# Patient Record
Sex: MALE | Race: WHITE
[De-identification: names, ages, dates, MRNs, and addresses within clinical notes are randomized per-mention and may not be internally consistent; named-entity substitution may affect disease eponyms.]

---

## 2021-04-07 ENCOUNTER — TRANSCRIPTION ENCOUNTER (OUTPATIENT)
Age: 21
End: 2021-04-07

## 2022-01-18 ENCOUNTER — ATHLETIC TRAINING (OUTPATIENT)
Dept: SPORTS MEDICINE | Facility: OTHER | Age: 22
End: 2022-01-18

## 2022-01-18 DIAGNOSIS — U07.1 COVID-19: Primary | ICD-10-CM

## 2022-01-18 NOTE — PROGRESS NOTES
1/18/22  A: COVID-19 Positive  Pt  Had a positive result during the week of 12/28  Started return to play with lifting session today    LB ATC

## 2022-04-11 ENCOUNTER — ATHLETIC TRAINING (OUTPATIENT)
Dept: SPORTS MEDICINE | Facility: OTHER | Age: 22
End: 2022-04-11

## 2022-04-11 DIAGNOSIS — S06.0X0A CONCUSSION WITHOUT LOSS OF CONSCIOUSNESS, INITIAL ENCOUNTER: Primary | ICD-10-CM

## 2022-04-11 NOTE — PROGRESS NOTES
Athletic Training Head Injury Evaluation     Name: Ruthie Harris  Age: 24 y o  Assessment/Plan:     Visit Diagnosis: Concussion without loss of consciousness, initial encounter [S06 0X0A]     Treatment Plan: Work to no symptoms    []  Follow-up PRN  []  Follow-up prior to next practice/game for re-evaluation  [x]  Daily treatment/rehab  Progress note expected weekly  Referral:      [x]  Not needed at this time  []  Will re-evaluate tomorrow to determine if referral is needed  []  Referred to:      []  Coaching staff notified  []  Parent/Guardian Notified     Subjective:  Date of Assessment: 4/11/2022  Date of Injury: 4/10/22     History: PMH of concussion     How many diagnosed concussions has the athlete had in the past? 2          When was the most recent concussion? Spring 2021     How long was the recovery from the most recent concussion? Month     Has the athlete ever been: Yes No   Hospitalized for a head injury? [] [x]   Diagnosed/treated for headache disorder or migraines? [] [x]   Diagnosed with a learning disability/dyslexia? [] [x]   Diagnosed with ADD/ADHD [] [x]   Diagnosed with depression, anxiety, or other psychiatric disorder? [] [x]      Current medications?  If yes, please list:   [x]  None  []  Yes:          Symptom Evaluation     0 = No Symptoms; 1 or 2 = Mild Symptoms; 3 or 4 = Moderate Symptoms, 5 or 6 = Severe Symptoms       (Insert Columns as needed for subsequent dates)  Date: 4/11/2022   Symptom Symptom Score   Headache 2    Pressure in head  1   Neck Pain  0   Nausea or vomiting  0   Dizziness  2   Blurred Vision  1   Balance Problems  0   Sensitivity to light  1   Sensitivity to noise  0   Feeling slowed down  1   Feeling like "in a fog"  1   Don't feel right  1   Difficulty concentrating  1   Difficulty remembering  1   Fatigue or low energy  1   Confusion  0   Drowsiness  1   More emotional  0   Irritability  0   Sadness  0   Nervous or Anxious  0   Trouble falling asleep (if applicable)  0   Total number of Symptoms (of 22):  12   Symptom Severity Score (of 132):  14   Do your symptoms get worse with physical activity? Do your symptoms get worse with mental activity? If 100% is feeling perfectly normal, what percent of normal do you feel? 70%     If not 100%, why? Difficulty focusing     Cognitive Screening     Orientation 0 1   What month is it? [] []   What is the date today? [] []   What is the day of the week? [] []   What year is it? [] []   What time is it right now? (Within 1 hour) [] []   Orientation Score   of 5      Immediate Memory                                                                                                   Score (of 5)  List 5 Word Lists Trial 1 Trial 2 Trial 3   [] Finger, [place]il[place], [place], Lemon, Insect         [] Candle, Paper, Sugar, [place], Wagon         [] Baby, Money, Perfume, [place], Iron         [] Elbow, Apple, Carpet, Saddle, Bubble         [] [place], [place], Pepper, [place], Movie         [] Dollar, Honey, Mirror, Saddle, Aromas         Immediate Memory Score   of 15      Concentration      Digits Backwards   [] [] [] Yes No 0/1   4-9-3 5-2-6 1-4-2 [] []     6-2-9 4-1-5 6-5-8 [] []    3-8-1-4 1-7-9-5 6-8-3-1 [] []     3-2-7-9 4-9-6-8 3-4-8-1 [] []    6-2-9-7-1 4-8-5-2-7 4-9-1-5-3 [] []     1-5-2-8-6 6-1-8-4-3 6-8-2-5-1 [] []    7-1-8-4-6-2 8-3-1-9-6-4 3-7-6-5-1-9 [] []     5-3-9-1-4-8 7-2-4-8-5-6 9-2-6-5-1-4 [] []    Digits Backwards Score   of 4   Months in Reverse Order  0 1   Dec-Nov-Oct-Sept-Aug-July-Jun-May-Apr-Mar-Feb-Jan [] []   Month Score   of 1   Concentration Total Score (Digits + Months)   of 5      Neurological Screen       Yes No   Can the patient read aloud (e g  symptom check-list) and follow instructions without difficulty? [] []   Does the patient have a full pain-free PASSIVE cervical spine movement?  [] []   Without moving their head or neck, can the patient look side-to-side and up-and-down without double vision? [] []   Can the patient perform the finger nose coordination test normally? [] []   Can the patient perform tandem gait normally?  [] []      Balance Examination  Modified Balance Error Scoring System (mBESS) testing     Which foot was tested (i e  which is the non-dominant foot):  []  Left  []  Right          Condition Errors   Double leg stance   of 10   Single leg stance (non-dominant foot)   of 10   Tandem stance (non-dominant foot at back)   of 10   Total Errors   of 30      Delayed Recall     Total number of words recalled accurately   of 5       Vestibular Ocular Motor Screen     Test/Symptoms Headache  (0-10) Dizziness  (0-10) Nausea  (0-10) Fogginess   (0-10)   Starting Symptoms (0-10)           Smooth Pursuits           Saccades - Horizontal           Saccades - Vertical           Convergence           VOR - Horizontal           VOR - Vertical           Visual Motion Sensitivity Test           Comments (if applicable):         Head Injury Protocol Treatment Log  (Insert Columns as needed for subsequent dates)  Date:     Current Step of Protocol:           Exercise/Drills                                                                    [facility] ATC

## 2022-04-13 ENCOUNTER — ATHLETIC TRAINING (OUTPATIENT)
Dept: SPORTS MEDICINE | Facility: OTHER | Age: 22
End: 2022-04-13

## 2022-04-13 DIAGNOSIS — S06.0X0A CONCUSSION WITHOUT LOSS OF CONSCIOUSNESS, INITIAL ENCOUNTER: Primary | ICD-10-CM

## 2022-04-13 DIAGNOSIS — U07.1 COVID: Primary | ICD-10-CM

## 2022-04-18 ENCOUNTER — ATHLETIC TRAINING (OUTPATIENT)
Dept: SPORTS MEDICINE | Facility: OTHER | Age: 22
End: 2022-04-18

## 2022-04-18 DIAGNOSIS — S06.0X0A CONCUSSION WITHOUT LOSS OF CONSCIOUSNESS, INITIAL ENCOUNTER: Primary | ICD-10-CM

## 2022-04-18 DIAGNOSIS — S63.635A SPRAIN OF INTERPHALANGEAL JOINT OF LEFT RING FINGER, INITIAL ENCOUNTER: Primary | ICD-10-CM

## 2022-04-19 ENCOUNTER — ATHLETIC TRAINING (OUTPATIENT)
Dept: SPORTS MEDICINE | Facility: OTHER | Age: 22
End: 2022-04-19

## 2022-04-19 DIAGNOSIS — S63.635A SPRAIN OF INTERPHALANGEAL JOINT OF LEFT RING FINGER, INITIAL ENCOUNTER: Primary | ICD-10-CM

## 2022-04-19 NOTE — PROGRESS NOTES
4/18/22  Pt completed the RTP to stage 4 at home  He complete a full non-contact practice tonight      [facility] ATC

## 2022-04-19 NOTE — PROGRESS NOTES
Athletic Training Wrist/Hand Evaluation    Name: Teresa Persaud  Age: 24 y o  Date of Assessment: 4/18/2022    Assessment/Plan:     Visit Diagnosis: Sprain of interphalangeal joint of left ring finger, initial encounter [I66 195O]    Treatment Plan: Protect from reinjury    []  Follow-up PRN  [x]  Follow-up prior to next practice/game for re-evaluation  []  Daily treatment/rehab  Progress note expected weekly       Referral:     [x]  Not needed at this time  []  Referred to:     []  Coaching staff notified  []  Parent/Guardian Notified    Subjective:    Date of Injury: 4/18/22    Injury occurred during:     [x]  Practice  []  Competition  []  Other:     Mechanism: Finger extended    Previous History: Previous finger sprains    Reported Symptoms:     [x] Hyperextension [] Numbness or tingling   [] Hyperflexion [] Weakness   [] Snapping sensation [] Grinding   [] Felt pop [] Sharp pain   [] Pain with rest [] Burning   [] Pain with activity [] Dull or achy   [] Loss of motion       Objective:    Observation:     [x]  No observable findings compared bilaterally    [x] Swelling [] [place] finger   [] Ecchymosis [] Mallet finger   [] Atrophy [] Abnormal contours   [] Callous or blister [] Nail abnormality   [] Deformity [] Subungual hematoma   [] [place] deformity [] Ingrown nail   [] [place] neck deformity [] Laceration     Palpation: Medial TTP    Active Range of Motion:      Full  ROM Limited  ROM Pain  with  ROM No  Motion   Wrist Flexion [] [] [] []   Wrist Extension [] [] [] []   Pronation [] [] [] []   Supination [] [] [] []   Radial Deviation [] [] [] []   Ulnar Deviation [] [] [] []   Thumb Flexion [] [] [] []   Thumb Extension [] [] [] []   Thumb Abduction [] [] [] []   Thumb Adduction [] [] [] []   MP Flexion [] [] [] []   MP Extension [] [] [] []   PIP Flexion [] [] [] []   PIP Extension [] [] [x] []   DIP Flexion [] [] [] []   DIP Extension [] [] [] []     Manual Muscle Tests:     Not performed [x] 5 4+ 4 4- 3 or  Under   Wrist Flexion [] [] [] [] []   Wrist Extension [] [] [] [] []   Pronation [] [] [] [] []   Supination [] [] [] [] []   Radial Deviation [] [] [] [] []   Ulnar Deviation [] [] [] [] []   Thumb Flexion [] [] [] [] []   Thumb Extension [] [] [] [] []   Thumb Abduction [] [] [] [] []   Thumb Adduction [] [] [] [] []   MP Flexion [] [] [] [] []   MP Extension [] [] [] [] []   PIP Flexion [] [] [] [] []   PIP Extension [] [] [] [] []   DIP Flexion [] [] [] [] []   DIP Extension [] [] [] [] []     Special Tests:      (+)  Laxity (+)  Pain (-)  WNL Not  Tested   Compression [] [] [x] []   Distraction [] [] [x] []   Percussion [] [] [] [x]   Tuning Fork [] [] [] [x]   Valgus Stress [] [] [x] []   Varus Stress [] [] [x] []   Wrist [place] [] [] [] [x]   Tinel's [] [] [] [x]   Phalen's [] [] [] [x]   Reverse Phalen's [] [] [] [x]   Finkelstein's [] [] [] [x]   Whitley Scaphoid Shift [] [] [] [x]   Triangular Fibrocartilage [] [] [] [x]   Lunotriquetrial Shear [] [] [] [x]     Treatment Log:     Date:    Playing Status:        Exercise/Treatment

## 2022-04-19 NOTE — PROGRESS NOTES
Athlete came in on 4/19/22 for f/u on finger injury from yesterday  No additional damage or laxity was found, he said pain is 7/10 and has improved from last night when it happened

## 2022-05-18 PROBLEM — Z00.00 ENCOUNTER FOR PREVENTIVE HEALTH EXAMINATION: Status: ACTIVE | Noted: 2022-05-18

## 2022-08-15 ENCOUNTER — ATHLETIC TRAINING (OUTPATIENT)
Dept: SPORTS MEDICINE | Facility: OTHER | Age: 22
End: 2022-08-15

## 2022-08-15 DIAGNOSIS — R51.9 HEADACHE IN FRONT OF HEAD: Primary | ICD-10-CM

## 2022-08-15 NOTE — PROGRESS NOTES
Athletic Training Head Injury Evaluation     Name: Delmar Sebastian  Age: 24 y o  Sport: Football     Assessment/Plan:  Visit Diagnosis: No primary diagnosis found  Treatment Plan:     []  Follow-up PRN  [x]  Follow-up prior to next practice/game for re-evaluation  []  Daily treatment/rehab  Progress note expected weekly  Referral:   [x]  Not needed at this time  []  Will re-evaluate tomorrow to determine if referral is needed  []  Referred to:      Subjective:  Date of Assessment: 8/15/2022  Date of Injury: 8/15/22     SCAT5+ on paper was completed today after practice  Ath was sure his headache was from dehydration  He did not report any concussive symptoms  Ath was advised to report to hospital if he began to have concussive symptoms that worsened (e g  vomiting, deteriorating status)  Symptom Evaluation     0 = No Symptoms; 1 or 2 = Mild Symptoms; 3 or 4 = Moderate Symptoms, 5 or 6 = Severe Symptoms       (Insert Columns as needed for subsequent dates)  Date: 8/15/2022   Symptom Symptom Score   Headache  0   Pressure in head 0    Neck Pain  0   Nausea or vomiting  0   Dizziness  0   Blurred Vision 0    Balance Problems 0    Sensitivity to light  0   Sensitivity to noise  0   Feeling slowed down  0   Feeling like "in a fog"  0   Don't feel right  0   Difficulty concentrating  0   Difficulty remembering  0   Fatigue or low energy  0   Confusion  0   Drowsiness  0   More emotional  0   Irritability  0   Sadness  0   Nervous or Anxious  0   Trouble falling asleep (if applicable)  0   Total number of Symptoms (of 22):  0   Symptom Severity Score (of 132):  0   Do your symptoms get worse with physical activity? no   Do your symptoms get worse with mental activity?   no no

## 2022-08-16 ENCOUNTER — ATHLETIC TRAINING (OUTPATIENT)
Dept: SPORTS MEDICINE | Facility: OTHER | Age: 22
End: 2022-08-16

## 2022-08-16 DIAGNOSIS — S06.0X0D CONCUSSION WITHOUT LOSS OF CONSCIOUSNESS, SUBSEQUENT ENCOUNTER: ICD-10-CM

## 2022-08-16 DIAGNOSIS — R51.9 HEADACHE IN FRONT OF HEAD: Primary | ICD-10-CM

## 2022-08-16 NOTE — PROGRESS NOTES
Athletic Training Head Injury Progress Note     Name: Delmar Sebastian  Age: 24 y o  Assessment/Plan:     Visit Diagnosis: Headache in front of head [R51 9]     Treatment Plan: Start concussion protocol, SCAT5 score 22     [] Athlete will be evaluated by their Physician for a possible concussion  Referred to:   [] Athlete has a follow-up appointment with their Physician scheduled for:   [] Athlete will continue with their return to learn/return to sport plans as outlined below   [] Athlete has completed their gradual return to play and may return to full unrestricted activity  Return to Learn Step:     [] Pending physician evaluation   [] No school at this time  May return on:   [] Shortened school days   [] Return to learn plan in place   [] 31 [phone] 35 in place   [] Athlete may begin their gradual return to full academics   [] Athlete has returned to full academics      Return to Sport Step:     [] Pending physician evaluation   [] No physical activity at this time  [] May participate in symptom-limited activity that does not provoke or increase symptoms  [] Step 1 - Light aerobic exercise  Goal is to increase heart rate    [] Step 2 - Sport Specific drills  Goal is to add movement while continuing to increase heart rate    [] Step 3 - Non-contact training drills  Goals are exercise, coordination, and increased thinking  [] Step 4 - Full contact practice  Goal is to restore confidence  Objective is to assess functionality of the athlete during play  [] Step 5 - Return to sport with no restrictions         Subjective:        Symptom Evaluation     0 = No Symptoms; 1 or 2 = Mild Symptoms; 3 or 4 = Moderate Symptoms, 5 or 6 = Severe Symptoms       (Insert Columns as needed for subsequent dates)  Date: 4/11/2022   Symptom Symptom Score   Headache 2    Pressure in head  1   Neck Pain  0   Nausea or vomiting  0   Dizziness  2   Blurred Vision  1   Balance Problems  0   Sensitivity to light  1   Sensitivity to noise  0   Feeling slowed down  1   Feeling like "in a fog"  1   Don't feel right  1   Difficulty concentrating  1   Difficulty remembering  1   Fatigue or low energy  1   Confusion  0   Drowsiness  1   More emotional  0   Irritability  0   Sadness  0   Nervous or Anxious  0   Trouble falling asleep (if applicable)  0   Total number of Symptoms (of 22):  12   Symptom Severity Score (of 132):  14   Do your symptoms get worse with physical activity? Do your symptoms get worse with mental activity? Objective:   See treatment log below        Date:    Playing Status:        Exercise/Treatment                                                         Athletic Training Head Injury Evaluation     Name: Delmar Sebastian  Age: 24 y o  Sport: Football     Assessment/Plan:  Visit Diagnosis: No primary diagnosis found  Treatment Plan:     []  Follow-up PRN  [x]  Follow-up prior to next practice/game for re-evaluation  []  Daily treatment/rehab  Progress note expected weekly  Referral:   [x]  Not needed at this time  []  Will re-evaluate tomorrow to determine if referral is needed  []  Referred to:      Subjective:  Date of Assessment: 8/16/2022  Date of Injury: 8/15/22     SCAT5+ on paper was completed today after practice  Ath was sure his headache was from dehydration  He did not report any concussive symptoms  Ath was advised to report to hospital if he began to have concussive symptoms that worsened (e g  vomiting, deteriorating status)            Symptom Evaluation     0 = No Symptoms; 1 or 2 = Mild Symptoms; 3 or 4 = Moderate Symptoms, 5 or 6 = Severe Symptoms       (Insert Columns as needed for subsequent dates)  Date: 8/16/2022   Symptom Symptom Score   Headache  0   Pressure in head 0    Neck Pain  0   Nausea or vomiting  0   Dizziness  0   Blurred Vision 0    Balance Problems 0    Sensitivity to light  0   Sensitivity to noise  0   Feeling slowed down  0   Feeling like "in a fog"  0   Don't feel right  0   Difficulty concentrating  0   Difficulty remembering  0   Fatigue or low energy  0   Confusion  0   Drowsiness  0   More emotional  0   Irritability  0   Sadness  0   Nervous or Anxious  0   Trouble falling asleep (if applicable)  0   Total number of Symptoms (of 22):  0   Symptom Severity Score (of 132):  0   Do your symptoms get worse with physical activity? no   Do your symptoms get worse with mental activity?   no

## 2022-08-17 ENCOUNTER — ATHLETIC TRAINING (OUTPATIENT)
Dept: SPORTS MEDICINE | Facility: OTHER | Age: 22
End: 2022-08-17

## 2022-08-17 DIAGNOSIS — S06.0X0D CONCUSSION WITHOUT LOSS OF CONSCIOUSNESS, SUBSEQUENT ENCOUNTER: ICD-10-CM

## 2022-08-17 DIAGNOSIS — R51.9 HEADACHE IN FRONT OF HEAD: Primary | ICD-10-CM

## 2022-08-17 NOTE — PROGRESS NOTES
Athletic Training Head Injury Progress Note     Name: Marla Cronin  Age: 24 y o  Assessment/Plan:     Visit Diagnosis: Headache in front of head [R51 9]     Treatment Plan: Start concussion protocol, SCAT5 score 22     [] Athlete will be evaluated by their Physician for a possible concussion  Referred to:   [] Athlete has a follow-up appointment with their Physician scheduled for:   [x] Athlete will continue with their return to learn/return to sport plans as outlined below   [] Athlete has completed their gradual return to play and may return to full unrestricted activity  Return to Learn Step:     [] Pending physician evaluation   [] No school at this time  May return on:   [] Shortened school days   [] Return to learn plan in place   [] 31 58 35 in place   [] Athlete may begin their gradual return to full academics   [] Athlete has returned to full academics      Return to Sport Step:     [] Pending physician evaluation   [x] No physical activity at this time  [] May participate in symptom-limited activity that does not provoke or increase symptoms  [] Step 1 - Light aerobic exercise  Goal is to increase heart rate    [] Step 2 - Sport Specific drills  Goal is to add movement while continuing to increase heart rate    [] Step 3 - Non-contact training drills  Goals are exercise, coordination, and increased thinking  [] Step 4 - Full contact practice  Goal is to restore confidence  Objective is to assess functionality of the athlete during play  [] Step 5 - Return to sport with no restrictions  Subjective:   Pt is feeling much better  He focused on recovering and slept well  Decreased S/S        Symptom Evaluation     0 = No Symptoms; 1 or 2 = Mild Symptoms; 3 or 4 = Moderate Symptoms, 5 or 6 = Severe Symptoms       (Insert Columns as needed for subsequent dates)  Date: 4/11/2022   Symptom Symptom Score   Headache 2    Pressure in head  1   Neck Pain  0   Nausea or vomiting  0   Dizziness 2   Blurred Vision  1   Balance Problems  0   Sensitivity to light  1   Sensitivity to noise  0   Feeling slowed down  1   Feeling like "in a fog"  1   Don't feel right  1   Difficulty concentrating  1   Difficulty remembering  1   Fatigue or low energy  1   Confusion  0   Drowsiness  1   More emotional  0   Irritability  0   Sadness  0   Nervous or Anxious  0   Trouble falling asleep (if applicable)  0   Total number of Symptoms (of 22):  12   Symptom Severity Score (of 132):  14   Do your symptoms get worse with physical activity? Do your symptoms get worse with mental activity? Objective:   See treatment log below        Date:    Playing Status:        Exercise/Treatment                                                         Athletic Training Head Injury Evaluation     Name: Paul Russell  Age: 24 y o  Sport: Football     Assessment/Plan:  Visit Diagnosis: Headache in front of head [R51 9]     Treatment Plan:     []  Follow-up PRN  [x]  Follow-up prior to next practice/game for re-evaluation  []  Daily treatment/rehab  Progress note expected weekly  Referral:   [x]  Not needed at this time  []  Will re-evaluate tomorrow to determine if referral is needed  []  Referred to:      Subjective:  Date of Assessment: 8/17/2022  Date of Injury: 8/15/22     SCAT5+ on paper was completed today after practice  Ath was sure his headache was from dehydration  He did not report any concussive symptoms  Ath was advised to report to hospital if he began to have concussive symptoms that worsened (e g  vomiting, deteriorating status)            Symptom Evaluation     0 = No Symptoms; 1 or 2 = Mild Symptoms; 3 or 4 = Moderate Symptoms, 5 or 6 = Severe Symptoms       (Insert Columns as needed for subsequent dates)  Date: 8/17/2022   Symptom Symptom Score   Headache  0   Pressure in head 0    Neck Pain  0   Nausea or vomiting  0   Dizziness  0   Blurred Vision 0    Balance Problems 0    Sensitivity to light 0   Sensitivity to noise  0   Feeling slowed down  0   Feeling like "in a fog"  0   Don't feel right  0   Difficulty concentrating  0   Difficulty remembering  0   Fatigue or low energy  0   Confusion  0   Drowsiness  0   More emotional  0   Irritability  0   Sadness  0   Nervous or Anxious  0   Trouble falling asleep (if applicable)  0   Total number of Symptoms (of 22):  0   Symptom Severity Score (of 132):  0   Do your symptoms get worse with physical activity? no   Do your symptoms get worse with mental activity? no         Athletic Training Head Injury Progress Note     Name: Radha Casey  Age: 24 y o  Assessment/Plan:     Visit Diagnosis: Headache in front of head [R51 9]     Treatment Plan:      [] Athlete will be evaluated by their Physician for a possible concussion  Referred to:   [] Athlete has a follow-up appointment with their Physician scheduled for:   [] Athlete will continue with their return to learn/return to sport plans as outlined below   [] Athlete has completed their gradual return to play and may return to full unrestricted activity  Return to Learn Step:     [] Pending physician evaluation   [] No school at this time  May return on:   [] Shortened school days   [] Return to learn plan in place   [] 31 58 35 in place   [] Athlete may begin their gradual return to full academics   [] Athlete has returned to full academics      Return to Sport Step:     [] Pending physician evaluation   [] No physical activity at this time  [] May participate in symptom-limited activity that does not provoke or increase symptoms  [] Step 1 - Light aerobic exercise  Goal is to increase heart rate    [] Step 2 - Sport Specific drills  Goal is to add movement while continuing to increase heart rate    [] Step 3 - Non-contact training drills  Goals are exercise, coordination, and increased thinking  [] Step 4 - Full contact practice  Goal is to restore confidence   Objective is to assess functionality of the athlete during play  [] Step 5 - Return to sport with no restrictions  Subjective:        Symptom Evaluation     0 = No Symptoms; 1 or 2 = Mild Symptoms; 3 or 4 = Moderate Symptoms, 5 or 6 = Severe Symptoms       (Insert Columns as needed for subsequent dates)  Date: 4/11/2022   Symptom Symptom Score   Headache 2    Pressure in head  1   Neck Pain  0   Nausea or vomiting  0   Dizziness  2   Blurred Vision  1   Balance Problems  0   Sensitivity to light  1   Sensitivity to noise  0   Feeling slowed down  1   Feeling like "in a fog"  1   Don't feel right  1   Difficulty concentrating  1   Difficulty remembering  1   Fatigue or low energy  1   Confusion  0   Drowsiness  1   More emotional  0   Irritability  0   Sadness  0   Nervous or Anxious  0   Trouble falling asleep (if applicable)  0   Total number of Symptoms (of 22):  12   Symptom Severity Score (of 132):  14   Do your symptoms get worse with physical activity? Do your symptoms get worse with mental activity?              Objective:   See treatment log below        Date:    Playing Status:        Exercise/Treatment

## 2022-08-18 ENCOUNTER — ATHLETIC TRAINING (OUTPATIENT)
Dept: SPORTS MEDICINE | Facility: OTHER | Age: 22
End: 2022-08-18

## 2022-08-18 DIAGNOSIS — S06.0X0D CONCUSSION WITHOUT LOSS OF CONSCIOUSNESS, SUBSEQUENT ENCOUNTER: Primary | ICD-10-CM

## 2022-08-18 NOTE — PROGRESS NOTES
Athletic Training Head Injury Progress Note     Name: Judge Hayward  Age: 24 y o  Assessment/Plan:     Visit Diagnosis: Concussion without loss of consciousness, subsequent encounter [S06 0X0D]     Treatment Plan: Start concussion protocol, SCAT5 score 5     [] Athlete will be evaluated by their Physician for a possible concussion  Referred to:   [] Athlete has a follow-up appointment with their Physician scheduled for:   [x] Athlete will continue with their return to learn/return to sport plans as outlined below   [] Athlete has completed their gradual return to play and may return to full unrestricted activity  Return to Learn Step:     [] Pending physician evaluation   [] No school at this time  May return on:   [] Shortened school days   [x] Return to learn plan in place   [] [address] in place   [] Athlete may begin their gradual return to full academics   [] Athlete has returned to full academics      Return to Sport Step:     [] Pending physician evaluation   [] No physical activity at this time  [x] May participate in symptom-limited activity that does not provoke or increase symptoms  [] Step 1 - Light aerobic exercise  Goal is to increase heart rate    [] Step 2 - Sport Specific drills  Goal is to add movement while continuing to increase heart rate    [] Step 3 - Non-contact training drills  Goals are exercise, coordination, and increased thinking  [] Step 4 - Full contact practice  Goal is to restore confidence  Objective is to assess functionality of the athlete during play  [] Step 5 - Return to sport with no restrictions  Subjective:   Pt is feeling much better  He focused on recovering and slept well  Decreased S/S        Symptom Evaluation     0 = No Symptoms; 1 or 2 = Mild Symptoms; 3 or 4 = Moderate Symptoms, 5 or 6 = Severe Symptoms       (Insert Columns as needed for subsequent dates)  Date: 4/11/2022   Symptom Symptom Score   Headache 1   Pressure in head     Neck Pain Nausea or vomiting     Dizziness 1    Blurred Vision 1    Balance Problems    Sensitivity to light  1   Sensitivity to noise     Feeling slowed down     Feeling like "in a fog"     Don't feel right     Difficulty concentrating     Difficulty remembering     Fatigue or low energy  1   Confusion     Drowsiness     More emotional     Irritability     Sadness     Nervous or Anxious     Trouble falling asleep (if applicable)     Total number of Symptoms (of 22):  5   Symptom Severity Score (of 132):  5   Do your symptoms get worse with physical activity? yes   Do your symptoms get worse with mental activity?  no         He was able to complete a 10 minute bike without an increase in symptoms  He will check in tomorrow for symptoms checklist and follow up

## 2022-08-21 ENCOUNTER — ATHLETIC TRAINING (OUTPATIENT)
Dept: SPORTS MEDICINE | Facility: OTHER | Age: 22
End: 2022-08-21

## 2022-08-21 DIAGNOSIS — S06.0X0D CONCUSSION WITHOUT LOSS OF CONSCIOUSNESS, SUBSEQUENT ENCOUNTER: Primary | ICD-10-CM

## 2022-08-21 DIAGNOSIS — R51.9 HEADACHE IN FRONT OF HEAD: ICD-10-CM

## 2022-08-21 NOTE — PROGRESS NOTES
Athletic Training Head Injury Progress Note     Name: Bryant Pfefifer  Age: 24 y o  Assessment/Plan:     Visit Diagnosis: Concussion without loss of consciousness, subsequent encounter [S06 0X0D]     Treatment Plan: Start concussion protocol, SCAT5 score 5     [] Athlete will be evaluated by their Physician for a possible concussion  Referred to:   [] Athlete has a follow-up appointment with their Physician scheduled for:   [x] Athlete will continue with their return to learn/return to sport plans as outlined below   [] Athlete has completed their gradual return to play and may return to full unrestricted activity  Return to Learn Step:     [] Pending physician evaluation   [] No school at this time  May return on:   [] Shortened school days   [x] Return to learn plan in place   [] [address] in place   [] Athlete may begin their gradual return to full academics   [] Athlete has returned to full academics      Return to Sport Step:     [] Pending physician evaluation   [] No physical activity at this time  [x] May participate in symptom-limited activity that does not provoke or increase symptoms  [] Step 1 - Light aerobic exercise  Goal is to increase heart rate    [] Step 2 - Sport Specific drills  Goal is to add movement while continuing to increase heart rate    [] Step 3 - Non-contact training drills  Goals are exercise, coordination, and increased thinking  [] Step 4 - Full contact practice  Goal is to restore confidence  Objective is to assess functionality of the athlete during play  [] Step 5 - Return to sport with no restrictions  Subjective:   Pt is feeling much better  He focused on recovering and slept well  Decreased S/S        Symptom Evaluation     0 = No Symptoms; 1 or 2 = Mild Symptoms; 3 or 4 = Moderate Symptoms, 5 or 6 = Severe Symptoms       (Insert Columns as needed for subsequent dates)  Date: 4/11/2022   Symptom Symptom Score   Headache 1   Pressure in head     Neck Pain Nausea or vomiting     Dizziness 1    Blurred Vision 1    Balance Problems    Sensitivity to light  1   Sensitivity to noise     Feeling slowed down     Feeling like "in a fog"     Don't feel right     Difficulty concentrating     Difficulty remembering     Fatigue or low energy  1   Confusion     Drowsiness     More emotional     Irritability     Sadness     Nervous or Anxious     Trouble falling asleep (if applicable)     Total number of Symptoms (of 22):  5   Symptom Severity Score (of 132):  5   Do your symptoms get worse with physical activity? yes   Do your symptoms get worse with mental activity?  no         He was able to complete a 10 minute bike without an increase in symptoms  He will check in tomorrow for symptoms checklist and follow up       8/21  Pt still has symptoms and would like wait till Tuesday to start conditioning    LB ATC

## 2022-08-23 ENCOUNTER — ATHLETIC TRAINING (OUTPATIENT)
Dept: SPORTS MEDICINE | Facility: OTHER | Age: 22
End: 2022-08-23

## 2022-08-23 DIAGNOSIS — R51.9 HEADACHE IN FRONT OF HEAD: ICD-10-CM

## 2022-08-23 DIAGNOSIS — S06.0X0D CONCUSSION WITHOUT LOSS OF CONSCIOUSNESS, SUBSEQUENT ENCOUNTER: Primary | ICD-10-CM

## 2022-08-23 NOTE — PROGRESS NOTES
Athletic Training Head Injury Progress Note     Name: Delmar Sebastian  Age: 24 y o  Assessment/Plan:     Visit Diagnosis: No primary diagnosis found  Treatment Plan: Start concussion protocol, SCAT5 score 5     [] Athlete will be evaluated by their Physician for a possible concussion  Referred to:   [] Athlete has a follow-up appointment with their Physician scheduled for:   [x] Athlete will continue with their return to learn/return to sport plans as outlined below   [] Athlete has completed their gradual return to play and may return to full unrestricted activity  Return to Learn Step:     [] Pending physician evaluation   [] No school at this time  May return on:   [] Shortened school days   [x] Return to learn plan in place   [] [address] in place   [] Athlete may begin their gradual return to full academics   [] Athlete has returned to full academics      Return to Sport Step:     [] Pending physician evaluation   [] No physical activity at this time  [x] May participate in symptom-limited activity that does not provoke or increase symptoms  [] Step 1 - Light aerobic exercise  Goal is to increase heart rate    [] Step 2 - Sport Specific drills  Goal is to add movement while continuing to increase heart rate    [] Step 3 - Non-contact training drills  Goals are exercise, coordination, and increased thinking  [] Step 4 - Full contact practice  Goal is to restore confidence  Objective is to assess functionality of the athlete during play  [] Step 5 - Return to sport with no restrictions  Subjective:   Pt is feeling much better  He focused on recovering and slept well  Decreased S/S        Symptom Evaluation     0 = No Symptoms; 1 or 2 = Mild Symptoms; 3 or 4 = Moderate Symptoms, 5 or 6 = Severe Symptoms       (Insert Columns as needed for subsequent dates)  Date: 4/11/2022   Symptom Symptom Score   Headache 1   Pressure in head     Neck Pain     Nausea or vomiting     Dizziness 1 Blurred Vision 1    Balance Problems    Sensitivity to light  1   Sensitivity to noise     Feeling slowed down     Feeling like "in a fog"     Don't feel right     Difficulty concentrating     Difficulty remembering     Fatigue or low energy  1   Confusion     Drowsiness     More emotional     Irritability     Sadness     Nervous or Anxious     Trouble falling asleep (if applicable)     Total number of Symptoms (of 22):  5   Symptom Severity Score (of 132):  5   Do your symptoms get worse with physical activity? yes   Do your symptoms get worse with mental activity?  no         He was able to complete a 10 minute bike without an increase in symptoms  He will check in tomorrow for symptoms checklist and follow up       8/21  Pt still has symptoms and would like wait till Tuesday to start conditioning  LB ATC    8/23/22  Ath has 0 symptoms and feels 100%  Today he completed 20min on bike

## 2022-08-24 ENCOUNTER — ATHLETIC TRAINING (OUTPATIENT)
Dept: SPORTS MEDICINE | Facility: OTHER | Age: 22
End: 2022-08-24

## 2022-08-24 DIAGNOSIS — R51.9 HEADACHE IN FRONT OF HEAD: ICD-10-CM

## 2022-08-24 DIAGNOSIS — S06.0X0D CONCUSSION WITHOUT LOSS OF CONSCIOUSNESS, SUBSEQUENT ENCOUNTER: Primary | ICD-10-CM

## 2022-08-24 NOTE — PROGRESS NOTES
Athletic Training Head Injury Progress Note     Name: Jaciel Cain  Age: 24 y o  Assessment/Plan:     Visit Diagnosis: Concussion without loss of consciousness, subsequent encounter [S06 0X0D]     Treatment Plan: Start concussion protocol, SCAT5 score 5     [] Athlete will be evaluated by their Physician for a possible concussion  Referred to:   [] Athlete has a follow-up appointment with their Physician scheduled for:   [x] Athlete will continue with their return to learn/return to sport plans as outlined below   [] Athlete has completed their gradual return to play and may return to full unrestricted activity  Return to Learn Step:     [] Pending physician evaluation   [] No school at this time  May return on:   [] Shortened school days   [x] Return to learn plan in place   [] [address] in place   [] Athlete may begin their gradual return to full academics   [] Athlete has returned to full academics      Return to Sport Step:     [] Pending physician evaluation   [] No physical activity at this time  [x] May participate in symptom-limited activity that does not provoke or increase symptoms  [] Step 1 - Light aerobic exercise  Goal is to increase heart rate    [] Step 2 - Sport Specific drills  Goal is to add movement while continuing to increase heart rate    [] Step 3 - Non-contact training drills  Goals are exercise, coordination, and increased thinking  [] Step 4 - Full contact practice  Goal is to restore confidence  Objective is to assess functionality of the athlete during play  [] Step 5 - Return to sport with no restrictions  Subjective:   Pt is feeling much better  He focused on recovering and slept well  Decreased S/S        Symptom Evaluation     0 = No Symptoms; 1 or 2 = Mild Symptoms; 3 or 4 = Moderate Symptoms, 5 or 6 = Severe Symptoms       (Insert Columns as needed for subsequent dates)  Date: 4/11/2022   Symptom Symptom Score   Headache 1   Pressure in head     Neck Pain Nausea or vomiting     Dizziness 1    Blurred Vision 1    Balance Problems    Sensitivity to light  1   Sensitivity to noise     Feeling slowed down     Feeling like "in a fog"     Don't feel right     Difficulty concentrating     Difficulty remembering     Fatigue or low energy  1   Confusion     Drowsiness     More emotional     Irritability     Sadness     Nervous or Anxious     Trouble falling asleep (if applicable)     Total number of Symptoms (of 22):  5   Symptom Severity Score (of 132):  5   Do your symptoms get worse with physical activity? yes   Do your symptoms get worse with mental activity?  no         He was able to complete a 10 minute bike without an increase in symptoms  He will check in tomorrow for symptoms checklist and follow up       8/21  Pt still has symptoms and would like wait till Tuesday to start conditioning  LB ATC    8/23/22  Ath has 0 symptoms and feels 100%  Today he completed 20min on bike  Athletic Training Head Injury Progress Note     Name: Brian Lowe  Age: 24 y o  Assessment/Plan:     Visit Diagnosis: Concussion without loss of consciousness, subsequent encounter [S06 0X0D]     Treatment Plan:      [] Athlete will be evaluated by their Physician for a possible concussion  Referred to:   [] Athlete has a follow-up appointment with their Physician scheduled for:   [] Athlete will continue with their return to learn/return to sport plans as outlined below   [] Athlete has completed their gradual return to play and may return to full unrestricted activity  Return to Learn Step:     [] Pending physician evaluation   [] No school at this time  May return on:   [] Shortened school days   [] Return to learn plan in place   [] 31 58 35 in place   [] Athlete may begin their gradual return to full academics   [] Athlete has returned to full academics      Return to Sport Step:     [] Pending physician evaluation   [] No physical activity at this time     [] May participate in symptom-limited activity that does not provoke or increase symptoms  [] Step 1 - Light aerobic exercise  Goal is to increase heart rate  [x] Step 2 - Sport Specific drills  Goal is to add movement while continuing to increase heart rate    [] Step 3 - Non-contact training drills  Goals are exercise, coordination, and increased thinking  [] Step 4 - Full contact practice  Goal is to restore confidence  Objective is to assess functionality of the athlete during play  [] Step 5 - Return to sport with no restrictions  Subjective:        Symptom Evaluation     0 = No Symptoms; 1 or 2 = Mild Symptoms; 3 or 4 = Moderate Symptoms, 5 or 6 = Severe Symptoms       (Insert Columns as needed for subsequent dates)  Date: 4/11/2022   Symptom Symptom Score   Headache 0   Pressure in head  0   Neck Pain  0   Nausea or vomiting 0    Dizziness 0    Blurred Vision 0    Balance Problems 0   Sensitivity to light  0   Sensitivity to noise  0   Feeling slowed down  0   Feeling like "in a fog"  0   Don't feel right  0   Difficulty concentrating  0   Difficulty remembering  0   Fatigue or low energy  0   Confusion  0   Drowsiness  0   More emotional  0   Irritability  0   Sadness  0   Nervous or Anxious  0   Trouble falling asleep (if applicable) 0    Total number of Symptoms (of 22):  5   Symptom Severity Score (of 132):  5   Do your symptoms get worse with physical activity? yes   Do your symptoms get worse with mental activity?   no                Objective:   See treatment log below        Date:    Playing Status:        Exercise/Treatment

## 2022-08-27 ENCOUNTER — ATHLETIC TRAINING (OUTPATIENT)
Dept: SPORTS MEDICINE | Facility: OTHER | Age: 22
End: 2022-08-27

## 2022-08-27 DIAGNOSIS — S06.0X0D CONCUSSION WITHOUT LOSS OF CONSCIOUSNESS, SUBSEQUENT ENCOUNTER: Primary | ICD-10-CM

## 2022-08-29 ENCOUNTER — ATHLETIC TRAINING (OUTPATIENT)
Dept: SPORTS MEDICINE | Facility: OTHER | Age: 22
End: 2022-08-29

## 2022-08-29 DIAGNOSIS — R51.9 HEADACHE IN FRONT OF HEAD: ICD-10-CM

## 2022-08-29 DIAGNOSIS — S06.0X0D CONCUSSION WITHOUT LOSS OF CONSCIOUSNESS, SUBSEQUENT ENCOUNTER: Primary | ICD-10-CM

## 2022-08-29 NOTE — PROGRESS NOTES
8/29/22    Ath states that his symptoms come and go at random times throughout the day  He states the lights bother them the worst  He went on 30min walk yesterday and states he did not start getting dizzy until the last 20min  His symptoms today: 5/22 symptoms, 5/132 score feeling 93%

## 2022-08-30 ENCOUNTER — ATHLETIC TRAINING (OUTPATIENT)
Dept: SPORTS MEDICINE | Facility: OTHER | Age: 22
End: 2022-08-30

## 2022-08-30 DIAGNOSIS — S06.0X0D CONCUSSION WITHOUT LOSS OF CONSCIOUSNESS, SUBSEQUENT ENCOUNTER: Primary | ICD-10-CM

## 2022-08-30 DIAGNOSIS — R51.9 HEADACHE IN FRONT OF HEAD: ICD-10-CM

## 2022-08-31 NOTE — PROGRESS NOTES
8/30/22  Completed RTP day 3    Ath states that his symptoms come and go at random times throughout the day  He states the lights bother them the worst  He went on 30min walk yesterday and states he did not start getting dizzy until the last 20min  His symptoms today: 5/22 symptoms, 5/132 score feeling 93%

## 2022-09-13 ENCOUNTER — ATHLETIC TRAINING (OUTPATIENT)
Dept: SPORTS MEDICINE | Facility: OTHER | Age: 22
End: 2022-09-13

## 2022-09-13 DIAGNOSIS — G89.29 CHRONIC LEFT-SIDED LOW BACK PAIN WITHOUT SCIATICA: Primary | ICD-10-CM

## 2022-09-13 DIAGNOSIS — M54.50 CHRONIC LEFT-SIDED LOW BACK PAIN WITHOUT SCIATICA: Primary | ICD-10-CM

## 2022-09-13 DIAGNOSIS — S39.012S STRAIN OF LUMBAR PARASPINOUS MUSCLE, SEQUELA: ICD-10-CM

## 2022-09-13 NOTE — PROGRESS NOTES
Athletic Training Back Evaluation    Name: Osvaldo Ramachandran  Age: 25 y o  Date of Assessment: 9/13/2022    Assessment/Plan:     Visit Diagnosis: Chronic left-sided low back pain without sciatica [M54 50, G89 29]    Treatment Plan: Continue to work for him to complete this on his own     []  Follow-up PRN  [x]  Follow-up prior to next practice/game for re-evaluation  []  Daily treatment/rehab  Progress note expected weekly       Referral:     [x]  Not needed at this time  []  Referred to:     [x]  Coaching staff notified  []  Parent/Guardian Notified    Subjective:    Date of Injury:9/13    Injury occurred during:     []  Practice  [x]  Competition  []  Other:     Mechanism: Sleeping    Previous History: Multiple incidences    Reported Symptoms:     [] Pain with rest [] Numbness or tingling   [x] Pain with activity [] Radiating pain   [] Pain with sleeping [] Weakness   [] Sharp pain [] Loss of motion   [] Dull pain [] Twisted   [] Pressure [] Felt/heard a pop   [] Burning [] Picacho/heard a crack     Objective:    Observation:     [x]  No observable findings compared bilaterally    [] Swelling [] Pelvic tilt   [] Deformity [] Spine curvature   [] Ecchymosis [] Winged scapula   [] Muscle spasm [] Abnormal posture   [] Abnormal gait [] Atrophy     Palpation: No TTP    Active Range of Motion:      Full  ROM Limited  ROM Pain  with  ROM No  Motion   Trunk Flexion  [] [] [] []   Trunk Extension [] [] [] []   Lateral Flexion (Left) [] [] [] []   Lateral Flexion (Right) [] [] [] []   Trunk Rotation (Left) [] [x] [x] []   Trunk Rotation (Right) [] [x] [] []   Hip Flexion [] [] [] []   Hip Extension [] [] [] []     Manual Muscle Tests:     Not performed [x]             5 4+ 4 4- 3 or  Under   Trunk Flexion  [] [] [] [] []   Trunk Extension [] [] [] [] []   Lateral Flexion (Left) [] [] [] [] []   Lateral Flexion (Right) [] [] [] [] []   Trunk Rotation (Left) [] [] [] [] []   Trunk Rotation (Right) [] [] [] [] []   Hip Flexion [] [] [] [] []   Hip Extension [] [] [] [] []     Special Tests:      (+)  POS (-)  NEG Not  Tested   Spring Test [] [x] []   Straight Leg Raise [] [] []   Valsalva [] [] []   Milgram's [] [] []   Kernig's/Brudzinski's []  [] []   Slump [] [] []   Quadrant [] [] []   Bowstring [] [] []   SI Compression/Distraction [] [] []   AKBAR [] [] []   Long Sit Test [] [] []   Trendelenberg's  [] [] []   Poe [] [] []     Lower Quarter Screen:  [x]  WNL  []  Abnormal    Treatment Log:     Date: 9/13/22   Playing Status: As Tolerated       Exercise/Treatment    TMR  Right to left

## 2022-09-18 ENCOUNTER — ATHLETIC TRAINING (OUTPATIENT)
Dept: SPORTS MEDICINE | Facility: OTHER | Age: 22
End: 2022-09-18

## 2022-09-18 DIAGNOSIS — M25.531 RIGHT WRIST PAIN: Primary | ICD-10-CM

## 2022-09-18 DIAGNOSIS — S66.911A MUSCLE STRAIN OF RIGHT WRIST, INITIAL ENCOUNTER: ICD-10-CM

## 2022-09-19 NOTE — PROGRESS NOTES
9/18/22  A: Right wrist strain  S: Pt would like his wrist assessed  No FRANC  O: MMT: weakness and pain in flexion and UD  P: Will reassess if the Pt returns    LB ATC

## 2022-10-06 ENCOUNTER — ATHLETIC TRAINING (OUTPATIENT)
Dept: SPORTS MEDICINE | Facility: OTHER | Age: 22
End: 2022-10-06

## 2022-10-06 DIAGNOSIS — D36.7 DERMOID CYST OF NECK: Primary | ICD-10-CM

## 2022-10-06 DIAGNOSIS — M54.2 NECK PAIN: ICD-10-CM

## 2022-10-07 NOTE — PROGRESS NOTES
10/6  A: Cyst Left side of neck  S: Pt report to the clinic after a teammate pointed out and swollen bump on the side of his neck  Pt shared that he has been nervous lately and he gets cyst in this neck area when he his anxious of nervous  No FRANC  O: Left side of neck localized edema  P: Completed ice bag on the area for 10 minutes then explained when to remove himself from practice    LB ATC

## 2022-10-28 ENCOUNTER — ATHLETIC TRAINING (OUTPATIENT)
Dept: SPORTS MEDICINE | Facility: OTHER | Age: 22
End: 2022-10-28

## 2022-10-28 DIAGNOSIS — S06.0X0D CONCUSSION WITHOUT LOSS OF CONSCIOUSNESS, SUBSEQUENT ENCOUNTER: Primary | ICD-10-CM

## 2024-06-13 ENCOUNTER — APPOINTMENT (OUTPATIENT)
Dept: ORTHOPEDIC SURGERY | Facility: CLINIC | Age: 24
End: 2024-06-13
Payer: COMMERCIAL

## 2024-06-13 DIAGNOSIS — G89.29 PAIN IN LEFT SHOULDER: ICD-10-CM

## 2024-06-13 DIAGNOSIS — M25.512 PAIN IN LEFT SHOULDER: ICD-10-CM

## 2024-06-13 PROCEDURE — 99203 OFFICE O/P NEW LOW 30 MIN: CPT

## 2024-06-13 PROCEDURE — 73030 X-RAY EXAM OF SHOULDER: CPT | Mod: LT

## 2024-06-13 RX ORDER — MELOXICAM 15 MG/1
15 TABLET ORAL
Qty: 30 | Refills: 0 | Status: ACTIVE | COMMUNITY
Start: 2024-06-13 | End: 1900-01-01

## 2024-06-13 NOTE — DISCUSSION/SUMMARY
[de-identified] : Chief complaint: Left shoulder pain  HPI: Patient is a 23-year-old right-hand-dominant male presents the office today for the evaluation of left shoulder pain which has been intermittent since 2022.  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  He reports that during the years that he played football he had 3 instances in which his left shoulder popped out of place/dislocated for approximately 2 seconds and then spontaneously reduced.  Patient never sought treatment for this.  Since stopping playing football he has been experiencing intermittent pain to the anterior aspect of the left shoulder with certain movements as well as with certain activity while in the gym.  He reports that after experiencing the pain it usually last for approximately 1 day and then resolves.  He denies any associated numbness or tingling down the left upper extremity.  Denies taking any medication for the relief of his discomfort.  ROS: Positive for left shoulder pain  Physical examination of the left shoulder shows: No erythema  No ecchymosis  Skin is intact Forward flexion 0-180 degrees Horizontal abduction 0-135 degrees Internal rotation to L2 lumbar level Mild tenderness to palpation over anterior shoulder No appreciable glenohumeral crepitus Positive O'Briens Test Positive Speed's Negative Goins / Sheldon Impingement Test Negative Neer's Test Pain with empty Can test, no appreciable weakness No appreciable pain or weakness with infraspinatus muscle testing Negative cross arm adduction  3 views of the left shoulder performed the office today show no obvious acute fracture, subluxation, or dislocation  Assessment/plan: Chronic pain of the left shoulder, discussed treatment options with the patient, given that the patient has a history of multiple dislocations to the left shoulder we will send for authorization for an MRI without contrast of the left shoulder to rule out internal derangement, patient will be provided with a prescription for formal physical therapy for the left shoulder so that he can get started on that, A prescription for meloxicam 15 mg once daily as needed with food was sent to the patient's pharmacy, confirmed no contraindications to NSAIDS. Patient denies being on a blood thinner. Denies history of GIB or GI ulcer.  Discussed in detail with the patient that they cannot take over-the-counter NSAIDs including but not limited to ibuprofen, Advil, Aleve, or Motrin while taking this medication.  They can continue to take over-the-counter Tylenol.  Patient can apply as needed ice or heat to the left shoulder with sensory precautions, patient verbalized understanding of all findings in the office today, he will be provided with a 6-week follow-up with LETICIA Heath or LETICIA Rodriguez, he agrees to follow-up as directed

## 2024-06-27 ENCOUNTER — APPOINTMENT (OUTPATIENT)
Dept: ORTHOPEDIC SURGERY | Facility: CLINIC | Age: 24
End: 2024-06-27

## 2024-06-28 ENCOUNTER — APPOINTMENT (OUTPATIENT)
Dept: ORTHOPEDIC SURGERY | Facility: CLINIC | Age: 24
End: 2024-06-28

## 2024-07-25 ENCOUNTER — APPOINTMENT (OUTPATIENT)
Dept: ORTHOPEDIC SURGERY | Facility: CLINIC | Age: 24
End: 2024-07-25